# Patient Record
Sex: FEMALE | Race: BLACK OR AFRICAN AMERICAN | NOT HISPANIC OR LATINO | Employment: UNEMPLOYED | URBAN - METROPOLITAN AREA
[De-identification: names, ages, dates, MRNs, and addresses within clinical notes are randomized per-mention and may not be internally consistent; named-entity substitution may affect disease eponyms.]

---

## 2023-02-27 LAB
CALCIDIOL (25 OH VITAMIN D3) (NG/ML) IN SER/PLAS: 26 NG/ML
COBALAMIN (VITAMIN B12) (PG/ML) IN SER/PLAS: 465 PG/ML (ref 211–911)
ERYTHROCYTE DISTRIBUTION WIDTH (RATIO) BY AUTOMATED COUNT: 13.6 % (ref 11.5–14.5)
ERYTHROCYTE MEAN CORPUSCULAR HEMOGLOBIN CONCENTRATION (G/DL) BY AUTOMATED: 31.5 G/DL (ref 32–36)
ERYTHROCYTE MEAN CORPUSCULAR VOLUME (FL) BY AUTOMATED COUNT: 92 FL (ref 80–100)
ERYTHROCYTES (10*6/UL) IN BLOOD BY AUTOMATED COUNT: 3.92 X10E12/L (ref 4–5.2)
HEMATOCRIT (%) IN BLOOD BY AUTOMATED COUNT: 35.9 % (ref 36–46)
HEMOGLOBIN (G/DL) IN BLOOD: 11.3 G/DL (ref 12–16)
LEUKOCYTES (10*3/UL) IN BLOOD BY AUTOMATED COUNT: 5 X10E9/L (ref 4.4–11.3)
NRBC (PER 100 WBCS) BY AUTOMATED COUNT: 0 /100 WBC (ref 0–0)
PLATELETS (10*3/UL) IN BLOOD AUTOMATED COUNT: 225 X10E9/L (ref 150–450)
THYROTROPIN (MIU/L) IN SER/PLAS BY DETECTION LIMIT <= 0.05 MIU/L: 2.01 MIU/L (ref 0.44–3.98)

## 2023-05-26 DIAGNOSIS — I10 PRIMARY HYPERTENSION: Primary | ICD-10-CM

## 2023-05-26 DIAGNOSIS — N95.1 MENOPAUSE SYNDROME: Primary | ICD-10-CM

## 2023-05-26 DIAGNOSIS — I10 PRIMARY HYPERTENSION: ICD-10-CM

## 2023-05-26 RX ORDER — TOPIRAMATE 100 MG/1
100 TABLET, FILM COATED ORAL 2 TIMES DAILY
Qty: 60 TABLET | Refills: 0 | Status: SHIPPED | OUTPATIENT
Start: 2023-05-26 | End: 2023-10-02 | Stop reason: SDUPTHER

## 2023-05-26 RX ORDER — ESTRADIOL 0.03 MG/D
1 FILM, EXTENDED RELEASE TRANSDERMAL 2 TIMES WEEKLY
COMMUNITY
Start: 2023-03-11 | End: 2023-05-26 | Stop reason: SDUPTHER

## 2023-05-26 RX ORDER — HYDROCHLOROTHIAZIDE 25 MG/1
25 TABLET ORAL DAILY
Qty: 30 TABLET | Refills: 0 | Status: SHIPPED | OUTPATIENT
Start: 2023-05-26 | End: 2023-05-31 | Stop reason: SDUPTHER

## 2023-05-26 RX ORDER — HYDROCHLOROTHIAZIDE 25 MG/1
1 TABLET ORAL DAILY
COMMUNITY
Start: 2022-06-27 | End: 2023-05-26 | Stop reason: SDUPTHER

## 2023-05-26 RX ORDER — TOPIRAMATE 100 MG/1
1 TABLET, FILM COATED ORAL 2 TIMES DAILY
COMMUNITY
Start: 2021-10-14 | End: 2023-05-26 | Stop reason: SDUPTHER

## 2023-05-26 RX ORDER — ESTRADIOL 0.03 MG/D
1 FILM, EXTENDED RELEASE TRANSDERMAL 2 TIMES WEEKLY
Qty: 8 PATCH | Refills: 0 | Status: SHIPPED | OUTPATIENT
Start: 2023-05-29 | End: 2023-06-05 | Stop reason: SDUPTHER

## 2023-05-31 RX ORDER — HYDROCHLOROTHIAZIDE 25 MG/1
TABLET ORAL
Qty: 90 TABLET | Refills: 3 | Status: SHIPPED | OUTPATIENT
Start: 2023-05-31 | End: 2023-10-02 | Stop reason: SDUPTHER

## 2023-06-05 ENCOUNTER — TELEMEDICINE (OUTPATIENT)
Dept: PRIMARY CARE | Facility: CLINIC | Age: 63
End: 2023-06-05
Payer: MEDICARE

## 2023-06-05 VITALS — HEIGHT: 64 IN | WEIGHT: 253 LBS | BODY MASS INDEX: 43.19 KG/M2

## 2023-06-05 DIAGNOSIS — N95.1 MENOPAUSE SYNDROME: ICD-10-CM

## 2023-06-05 DIAGNOSIS — G47.33 OSA (OBSTRUCTIVE SLEEP APNEA): ICD-10-CM

## 2023-06-05 DIAGNOSIS — K21.00 GASTROESOPHAGEAL REFLUX DISEASE WITH ESOPHAGITIS, UNSPECIFIED WHETHER HEMORRHAGE: Primary | ICD-10-CM

## 2023-06-05 DIAGNOSIS — I10 HTN (HYPERTENSION), BENIGN: ICD-10-CM

## 2023-06-05 PROBLEM — E66.01 MORBID (SEVERE) OBESITY DUE TO EXCESS CALORIES (MULTI): Status: ACTIVE | Noted: 2019-03-12

## 2023-06-05 PROBLEM — K21.9 GERD (GASTROESOPHAGEAL REFLUX DISEASE): Status: ACTIVE | Noted: 2023-06-05

## 2023-06-05 PROBLEM — F31.9 BIPOLAR 1 DISORDER (MULTI): Status: ACTIVE | Noted: 2023-06-05

## 2023-06-05 PROBLEM — K59.00 CONSTIPATION: Status: ACTIVE | Noted: 2023-06-05

## 2023-06-05 PROBLEM — E55.9 VITAMIN D INSUFFICIENCY: Status: ACTIVE | Noted: 2023-06-05

## 2023-06-05 PROCEDURE — 99214 OFFICE O/P EST MOD 30 MIN: CPT | Performed by: FAMILY MEDICINE

## 2023-06-05 RX ORDER — VENLAFAXINE HYDROCHLORIDE 150 MG/1
150 TABLET, EXTENDED RELEASE ORAL
COMMUNITY
Start: 2022-12-15 | End: 2023-10-02 | Stop reason: SDUPTHER

## 2023-06-05 RX ORDER — ESTRADIOL 0.03 MG/D
1 FILM, EXTENDED RELEASE TRANSDERMAL 2 TIMES WEEKLY
Qty: 8 PATCH | Refills: 1 | Status: SHIPPED | OUTPATIENT
Start: 2023-06-05 | End: 2023-07-14

## 2023-06-05 RX ORDER — SUMATRIPTAN 50 MG/1
50 TABLET, FILM COATED ORAL ONCE AS NEEDED
COMMUNITY

## 2023-06-05 RX ORDER — OMEPRAZOLE 40 MG/1
40 CAPSULE, DELAYED RELEASE ORAL DAILY
COMMUNITY
End: 2023-10-02 | Stop reason: SDUPTHER

## 2023-06-05 RX ORDER — LURASIDONE HYDROCHLORIDE 60 MG/1
60 TABLET, FILM COATED ORAL
COMMUNITY
Start: 2021-05-16 | End: 2023-10-02 | Stop reason: SDUPTHER

## 2023-06-05 ASSESSMENT — PAIN SCALES - GENERAL: PAINLEVEL: 4

## 2023-06-05 NOTE — PROGRESS NOTES
"  Subjective   Chief complaint: Kya Mims is a 62 y.o. female who presents for Follow-up (Med refill).    HPI:  This is a 62 years old female who is here for a follow-up visit and medication refill as well as for fatigue and loss of energy.  She reports the symptoms have been going on for quite some time, described as  pushing her self to do things.  She reports snoring and occasional headaches as well as episodes of cough that comes with no known triggers, treated for laryngeal reflux with PPIs but states is not helping.    Ros:  Patient denies  Shortness of breath , chest pain  Nausea vomiting and diarrhea  No hearing or vision changes  No skin lesions.    Objective   Ht 1.626 m (5' 4\")   Wt 115 kg (253 lb)   BMI 43.43 kg/m²       PHYSICAL EXAMINATION:  GENERAL: Alert,In no apparent distress  HEENT: Normocephalic, atraumatic. Extraocular movements intact.  NECK: Supple.  CHEST: Non labored breathing  EXTREMITIES: NROM  NEUROLOGIC: Motor Functions Grossly intact       I have reviewed and reconciled the medication list with the patient today.   Current Outpatient Medications:     lurasidone (Latuda) 60 mg tablet, Take 1 tablet (60 mg) by mouth once daily with a meal., Disp: , Rfl:     venlafaxine 150 mg 24 hr tablet, Take 1 tablet (150 mg) by mouth once daily with a meal., Disp: , Rfl:     estradiol (Vivelle-DOT) 0.025 mg/24 hr patch, Place 1 patch on the skin 2 times a week. AS DIRECTED, Disp: 8 patch, Rfl: 1    hydroCHLOROthiazide (HYDRODiuril) 25 mg tablet, TAKE 1 TABLET BY MOUTH EVERY DAY, Disp: 90 tablet, Rfl: 3    omeprazole (PriLOSEC) 40 mg DR capsule, Take 1 capsule (40 mg) by mouth once daily., Disp: , Rfl:     SUMAtriptan (Imitrex) 50 mg tablet, Take 1 tablet (50 mg) by mouth 1 time if needed for migraine., Disp: , Rfl:     topiramate (Topamax) 100 mg tablet, Take 1 tablet (100 mg) by mouth 2 times a day., Disp: 60 tablet, Rfl: 0     Imaging:  No results found.     Labs reviewed:    Lab Results "   Component Value Date    WBC 5.0 02/27/2023    HGB 11.3 (L) 02/27/2023    HCT 35.9 (L) 02/27/2023     02/27/2023    CHOL 194 06/27/2022    TRIG 78 06/27/2022    HDL 51.7 06/27/2022    ALT 12 06/27/2022    AST 16 06/27/2022     06/27/2022    K 3.7 06/27/2022     06/27/2022    CREATININE 1.48 (H) 06/27/2022    BUN 22 06/27/2022    CO2 28 06/27/2022    TSH 2.01 02/27/2023    HGBA1C 5.1 06/27/2022         Assessment/Plan     Diagnoses and all orders for this visit:  62 years old with episodes of cough as well as feeling tired most probably related to her obstructive sleep apnea which is not treated.  We will also evaluate her cough with a modified barium swallow.  Gastroesophageal reflux disease with esophagitis, unspecified whether hemorrhage  -     SLP Modified Barium Swallow Evaluation; Future  Menopause syndrome  -     estradiol (Vivelle-DOT) 0.025 mg/24 hr patch; Place 1 patch on the skin 2 times a week. AS DIRECTED  HTN (hypertension), benign  -     Comprehensive Metabolic Panel; Future  BHUMI (obstructive sleep apnea)  -     Referral to Adult Sleep Medicine; Future      Diagnosis and Management discussed with the patient.  Patient agreeable with plan.  Patient advised to Return to clinic with new or unresolved symptoms.  Chantelle Said MD    This note was partially generated using the Dragon Voice Recognition System and there may be some incorrect words or wording, spelling and /or spelling errors, or punctuation errors that were not corrected prior to committing the note to the medical record.

## 2023-10-02 ENCOUNTER — TELEMEDICINE (OUTPATIENT)
Dept: PRIMARY CARE | Facility: CLINIC | Age: 63
End: 2023-10-02
Payer: MEDICARE

## 2023-10-02 VITALS — HEIGHT: 64 IN | BODY MASS INDEX: 43.43 KG/M2

## 2023-10-02 DIAGNOSIS — K21.9 GASTROESOPHAGEAL REFLUX DISEASE WITHOUT ESOPHAGITIS: Primary | ICD-10-CM

## 2023-10-02 DIAGNOSIS — Z13.220 LIPID SCREENING: ICD-10-CM

## 2023-10-02 DIAGNOSIS — R05.3 CHRONIC COUGH: ICD-10-CM

## 2023-10-02 DIAGNOSIS — I10 HTN (HYPERTENSION), BENIGN: ICD-10-CM

## 2023-10-02 DIAGNOSIS — I10 PRIMARY HYPERTENSION: ICD-10-CM

## 2023-10-02 DIAGNOSIS — F43.10 POSTTRAUMATIC STRESS DISORDER: ICD-10-CM

## 2023-10-02 DIAGNOSIS — Z12.31 SCREENING MAMMOGRAM FOR BREAST CANCER: ICD-10-CM

## 2023-10-02 PROBLEM — E66.01 MORBID OBESITY WITH BODY MASS INDEX (BMI) OF 40.0 OR HIGHER (MULTI): Status: ACTIVE | Noted: 2023-10-02

## 2023-10-02 PROCEDURE — 99214 OFFICE O/P EST MOD 30 MIN: CPT | Performed by: FAMILY MEDICINE

## 2023-10-02 RX ORDER — OMEPRAZOLE 40 MG/1
40 CAPSULE, DELAYED RELEASE ORAL DAILY
Qty: 90 CAPSULE | Refills: 3 | Status: SHIPPED | OUTPATIENT
Start: 2023-10-02 | End: 2024-10-01

## 2023-10-02 RX ORDER — LURASIDONE HYDROCHLORIDE 60 MG/1
60 TABLET, FILM COATED ORAL
Qty: 90 TABLET | Refills: 0 | Status: SHIPPED | OUTPATIENT
Start: 2023-10-02

## 2023-10-02 RX ORDER — VENLAFAXINE HYDROCHLORIDE 150 MG/1
150 TABLET, EXTENDED RELEASE ORAL
Qty: 90 TABLET | Refills: 3 | Status: SHIPPED | OUTPATIENT
Start: 2023-10-02 | End: 2024-10-01

## 2023-10-02 RX ORDER — HYDROCHLOROTHIAZIDE 25 MG/1
25 TABLET ORAL DAILY
Qty: 90 TABLET | Refills: 3 | Status: SHIPPED | OUTPATIENT
Start: 2023-10-02

## 2023-10-02 RX ORDER — TOPIRAMATE 100 MG/1
100 TABLET, FILM COATED ORAL 2 TIMES DAILY
Qty: 180 TABLET | Refills: 3 | Status: SHIPPED | OUTPATIENT
Start: 2023-10-02 | End: 2024-10-01

## 2023-10-02 ASSESSMENT — ENCOUNTER SYMPTOMS
FEVER: 0
SWEATS: 1
MYALGIAS: 1
SHORTNESS OF BREATH: 1
RHINORRHEA: 1
SORE THROAT: 1
HEMOPTYSIS: 0
HEADACHES: 1
WEIGHT LOSS: 0
COUGH: 1
HEARTBURN: 1
WHEEZING: 1

## 2023-10-02 ASSESSMENT — PAIN SCALES - GENERAL: PAINLEVEL: 0-NO PAIN

## 2023-10-02 NOTE — PROGRESS NOTES
"  Subjective   Chief complaint: Kya Mims is a 62 y.o. female who presents for Follow-up, Cough, and Med Refill.    HPI:  Subjective   Kya Mims is an 62 y.o. female who presents for follow up of GERD. This has been associated with cough. She denies heartburn, nausea, and shortness of breath. Symptoms have been present for several months. She denies dysphagia. She has not lost weight. She denies melena, hematochezia, hematemesis, and coffee ground emesis. Medical therapy in the past has included: proton pump inhibitors.  GERD was though to be reason for cough after A scope. States had PFTs in the past.   Cough , unchanged , comes in Bouts, resolves spontaneously.    Hypertension,patient denies chest pain,SHortness of breath,Palpitation,Headache and blurry vision.  But complains of - ,continues to complain of -. Medications reviewed with patient and patient reports compliance with meds.    Cousin passed away yesterday with Pancreatic cancer recently.        Ros:  Patient denies  Shortness of breath , chest pain  Nausea vomiting and diarrhea  No fever or chills  No dysuria  No hearing or vision changes  No skin lesions.    Objective   Ht 1.626 m (5' 4\")   BMI 43.43 kg/m²       PHYSICAL EXAMINATION:  GENERAL: Alert,In no apparent distress  HEENT: Normocephalic, atraumatic. Extraocular movements intact.  NECK: Supple.  CHEST: Non labored breathing  EXTREMITIES: NROM  NEUROLOGIC: Motor Functions Grossly intact       I have reviewed and reconciled the medication list with the patient today.   Current Outpatient Medications:     estradiol (Vivelle-DOT) 0.025 mg/24 hr patch, PLACE 1 PATCH ON THE SKIN 2 TIMES A WEEK. AS DIRECTED, Disp: 24 patch, Rfl: 1    hydroCHLOROthiazide (HYDRODiuril) 25 mg tablet, TAKE 1 TABLET BY MOUTH EVERY DAY, Disp: 90 tablet, Rfl: 3    lurasidone (Latuda) 60 mg tablet, Take 1 tablet (60 mg) by mouth once daily with a meal., Disp: , Rfl:     omeprazole (PriLOSEC) 40 mg DR capsule, Take 1 " capsule (40 mg) by mouth once daily., Disp: , Rfl:     SUMAtriptan (Imitrex) 50 mg tablet, Take 1 tablet (50 mg) by mouth 1 time if needed for migraine., Disp: , Rfl:     venlafaxine 150 mg 24 hr tablet, Take 1 tablet (150 mg) by mouth once daily with a meal., Disp: , Rfl:     topiramate (Topamax) 100 mg tablet, Take 1 tablet (100 mg) by mouth 2 times a day., Disp: 60 tablet, Rfl: 0     Imaging:  No results found.     Labs reviewed:    Lab Results   Component Value Date    WBC 5.0 02/27/2023    HGB 11.3 (L) 02/27/2023    HCT 35.9 (L) 02/27/2023     02/27/2023    CHOL 194 06/27/2022    TRIG 78 06/27/2022    HDL 51.7 06/27/2022    ALT 12 06/27/2022    AST 16 06/27/2022     06/27/2022    K 3.7 06/27/2022     06/27/2022    CREATININE 1.48 (H) 06/27/2022    BUN 22 06/27/2022    CO2 28 06/27/2022    TSH 2.01 02/27/2023    HGBA1C 5.1 06/27/2022         Assessment/Plan   Diagnoses and all orders for this visit:  Gastroesophageal reflux disease without esophagitis  -     omeprazole (PriLOSEC) 40 mg DR capsule; Take 1 capsule (40 mg) by mouth once daily.  HTN (hypertension), benign  -     Comprehensive Metabolic Panel; Future  Posttraumatic stress disorder: needs refills, till Psych follow up visit  -     topiramate (Topamax) 100 mg tablet; Take 1 tablet (100 mg) by mouth 2 times a day.  -     lurasidone (Latuda) 60 mg tablet; Take 1 tablet (60 mg) by mouth once daily with a meal.  -     venlafaxine 150 mg 24 hr tablet; Take 1 tablet (150 mg) by mouth once daily with a meal.  Screening mammogram for breast cancer  -     BI mammo bilateral screening tomosynthesis; Future  Lipid screening  -     Lipid Panel; Future  Chronic cough: unclear eityology ?GERD  -     Referral to Pulmonology; Future  Primary hypertension  -     hydroCHLOROthiazide (HYDRODiuril) 25 mg tablet; Take 1 tablet (25 mg) by mouth once daily.    Diagnosis and Management discussed with the patient.  Patient agreeable with plan.  Patient  advised to Return to clinic with new or unresolved symptoms.  Chantelle Said MD    This note was partially generated using the Dragon Voice Recognition System and there may be some incorrect words or wording, spelling and /or spelling errors, or punctuation errors that were not corrected prior to committing the note to the medical record.